# Patient Record
Sex: MALE | Race: WHITE | NOT HISPANIC OR LATINO
[De-identification: names, ages, dates, MRNs, and addresses within clinical notes are randomized per-mention and may not be internally consistent; named-entity substitution may affect disease eponyms.]

---
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
## 2021-02-07 ENCOUNTER — EMERGENCY (EMERGENCY)
Facility: HOSPITAL | Age: 21
LOS: 1 days | End: 2021-02-07
Admitting: EMERGENCY MEDICINE
Payer: MEDICAID

## 2021-02-07 PROCEDURE — 99285 EMERGENCY DEPT VISIT HI MDM: CPT

## 2021-02-07 PROCEDURE — 93010 ELECTROCARDIOGRAM REPORT: CPT

## 2021-03-20 ENCOUNTER — EMERGENCY (EMERGENCY)
Facility: HOSPITAL | Age: 21
LOS: 1 days | End: 2021-03-20
Admitting: EMERGENCY MEDICINE
Payer: MEDICAID

## 2021-03-20 PROCEDURE — 99285 EMERGENCY DEPT VISIT HI MDM: CPT

## 2021-03-20 PROCEDURE — 93010 ELECTROCARDIOGRAM REPORT: CPT

## 2021-05-03 ENCOUNTER — NON-APPOINTMENT (OUTPATIENT)
Age: 21
End: 2021-05-03

## 2022-04-07 ENCOUNTER — APPOINTMENT (OUTPATIENT)
Dept: FAMILY MEDICINE | Facility: CLINIC | Age: 22
End: 2022-04-07
Payer: MEDICAID

## 2022-04-07 VITALS
DIASTOLIC BLOOD PRESSURE: 70 MMHG | OXYGEN SATURATION: 98 % | HEART RATE: 70 BPM | TEMPERATURE: 98.2 F | BODY MASS INDEX: 19.88 KG/M2 | RESPIRATION RATE: 16 BRPM | HEIGHT: 71 IN | SYSTOLIC BLOOD PRESSURE: 110 MMHG | WEIGHT: 142 LBS

## 2022-04-07 DIAGNOSIS — F41.9 ANXIETY DISORDER, UNSPECIFIED: ICD-10-CM

## 2022-04-07 DIAGNOSIS — Z00.00 ENCOUNTER FOR GENERAL ADULT MEDICAL EXAMINATION W/OUT ABNORMAL FINDINGS: ICD-10-CM

## 2022-04-07 DIAGNOSIS — F19.90 OTHER PSYCHOACTIVE SUBSTANCE USE, UNSPECIFIED, UNCOMPLICATED: ICD-10-CM

## 2022-04-07 LAB
AMPHET UR-MCNC: NORMAL
BARBITURATES UR-MCNC: NORMAL
BENZODIAZ UR-MCNC: NORMAL
BILIRUB UR QL STRIP: NEGATIVE
CLARITY UR: CLEAR
COCAINE METAB.OTHER UR-MCNC: NORMAL
COLLECTION METHOD: NORMAL
DRUG SCREEN, URINE ROUTINE: YES
GLUCOSE UR-MCNC: NEGATIVE
HCG UR QL: 0.2 EU/DL
HGB UR QL STRIP.AUTO: NORMAL
KETONES UR-MCNC: NEGATIVE
LEUKOCYTE ESTERASE UR QL STRIP: NEGATIVE
METHADONE UR-MCNC: NORMAL
NITRITE UR QL STRIP: NEGATIVE
OPIATES UR-MCNC: NORMAL
PCP UR-MCNC: NORMAL
PH UR STRIP: 7
PROT UR STRIP-MCNC: 30
SP GR UR STRIP: 1.02
THC UR QL: NORMAL

## 2022-04-07 PROCEDURE — 99395 PREV VISIT EST AGE 18-39: CPT | Mod: 25

## 2022-04-07 PROCEDURE — 81003 URINALYSIS AUTO W/O SCOPE: CPT | Mod: QW

## 2022-04-07 RX ORDER — FLUOXETINE HYDROCHLORIDE 10 MG/1
10 CAPSULE ORAL
Qty: 30 | Refills: 0 | Status: ACTIVE | COMMUNITY
Start: 2022-04-07 | End: 1900-01-01

## 2022-04-07 NOTE — HEALTH RISK ASSESSMENT
[Never] : Never [No] : In the past 12 months have you used drugs other than those required for medical reasons? No [0] : 2) Feeling down, depressed, or hopeless: Not at all (0) [PHQ-2 Negative - No further assessment needed] : PHQ-2 Negative - No further assessment needed [Audit-CScore] : 0 [[id number]] : 0

## 2022-04-07 NOTE — PHYSICAL EXAM
[Normal Outer Ear/Nose] : the outer ears and nose were normal in appearance [Normal TMs] : both tympanic membranes were normal [No Abdominal Bruit] : a ~M bruit was not heard ~T in the abdomen [No Edema] : there was no peripheral edema [No Extremity Clubbing/Cyanosis] : no extremity clubbing/cyanosis [Normal Gait] : normal gait [Alert and Oriented x3] : oriented to person, place, and time [Normal] : affect was normal and insight and judgment were intact [de-identified] : Brisk reflexes [de-identified] : pt has improved eye contact

## 2022-04-07 NOTE — HISTORY OF PRESENT ILLNESS
[FreeTextEntry1] : CPE \par pt not fasting ate pancakes 2 hrs ago  [de-identified] : Annual physical exam\arianne Christensen is a pt known to me as having ADD and likely on the autism spectrum. He states he will be moving to [facility] to live with his maternal aunt soon. he states his mother committed suicide one month ago. His half sibling (7 years younger) will live with his step father\par He is currently on Prozac 10mg\arianne Christensen had multiple ER / psych visits to [facility] last year for substance abuse ( amphetamine, benzo, alcohol and marijuana)

## 2022-04-07 NOTE — PLAN
[FreeTextEntry1] : Pt has Psych follow up with Erica Austin within 1 month\par \par Pt urged to avoid taking old benzo Rx and only take psych meds currently prescribed.\par Eat regularly and avoid processed foods\par \par Blood drawn in office\par

## 2022-04-08 LAB
25(OH)D3 SERPL-MCNC: 33.2 NG/ML
ALBUMIN SERPL ELPH-MCNC: 5.2 G/DL
ALP BLD-CCNC: 91 U/L
ALT SERPL-CCNC: 28 U/L
ANION GAP SERPL CALC-SCNC: 18 MMOL/L
AST SERPL-CCNC: 17 U/L
BASOPHILS # BLD AUTO: 0.04 K/UL
BASOPHILS NFR BLD AUTO: 0.6 %
BILIRUB SERPL-MCNC: 0.4 MG/DL
BUN SERPL-MCNC: 10 MG/DL
CALCIUM SERPL-MCNC: 9.7 MG/DL
CHLORIDE SERPL-SCNC: 104 MMOL/L
CHOLEST SERPL-MCNC: 153 MG/DL
CO2 SERPL-SCNC: 21 MMOL/L
CREAT SERPL-MCNC: 0.78 MG/DL
EGFR: 130 ML/MIN/1.73M2
EOSINOPHIL # BLD AUTO: 0.2 K/UL
EOSINOPHIL NFR BLD AUTO: 3.1 %
GLUCOSE SERPL-MCNC: 77 MG/DL
HCT VFR BLD CALC: 43.7 %
HDLC SERPL-MCNC: 51 MG/DL
HGB BLD-MCNC: 14.6 G/DL
IMM GRANULOCYTES NFR BLD AUTO: 0.2 %
LDLC SERPL CALC-MCNC: 78 MG/DL
LYMPHOCYTES # BLD AUTO: 2.44 K/UL
LYMPHOCYTES NFR BLD AUTO: 37.4 %
MAN DIFF?: NORMAL
MCHC RBC-ENTMCNC: 30.7 PG
MCHC RBC-ENTMCNC: 33.4 GM/DL
MCV RBC AUTO: 92 FL
MONOCYTES # BLD AUTO: 0.78 K/UL
MONOCYTES NFR BLD AUTO: 11.9 %
NEUTROPHILS # BLD AUTO: 3.06 K/UL
NEUTROPHILS NFR BLD AUTO: 46.8 %
NONHDLC SERPL-MCNC: 102 MG/DL
PLATELET # BLD AUTO: 259 K/UL
POTASSIUM SERPL-SCNC: 4.1 MMOL/L
PROT SERPL-MCNC: 7.4 G/DL
RBC # BLD: 4.75 M/UL
RBC # FLD: 12.9 %
SODIUM SERPL-SCNC: 143 MMOL/L
TRIGL SERPL-MCNC: 120 MG/DL
TSH SERPL-ACNC: 0.62 UIU/ML
WBC # FLD AUTO: 6.53 K/UL

## 2022-06-24 ENCOUNTER — NON-APPOINTMENT (OUTPATIENT)
Age: 22
End: 2022-06-24

## 2022-06-25 ENCOUNTER — EMERGENCY (EMERGENCY)
Facility: HOSPITAL | Age: 22
LOS: 1 days | Discharge: HOME | End: 2022-06-25
Admitting: EMERGENCY MEDICINE
Payer: MEDICAID

## 2022-06-25 DIAGNOSIS — R31.29 OTHER MICROSCOPIC HEMATURIA: ICD-10-CM

## 2022-06-25 DIAGNOSIS — R11.0 NAUSEA: ICD-10-CM

## 2022-06-25 DIAGNOSIS — N23 UNSPECIFIED RENAL COLIC: ICD-10-CM

## 2022-06-25 PROCEDURE — 99285 EMERGENCY DEPT VISIT HI MDM: CPT

## 2022-06-25 PROCEDURE — 74176 CT ABD & PELVIS W/O CONTRAST: CPT | Mod: 26,ME

## 2022-06-25 PROCEDURE — G1004: CPT

## 2022-06-28 DIAGNOSIS — Z87.442 PERSONAL HISTORY OF URINARY CALCULI: ICD-10-CM
